# Patient Record
Sex: FEMALE | Race: WHITE | NOT HISPANIC OR LATINO | Employment: FULL TIME | URBAN - NONMETROPOLITAN AREA
[De-identification: names, ages, dates, MRNs, and addresses within clinical notes are randomized per-mention and may not be internally consistent; named-entity substitution may affect disease eponyms.]

---

## 2023-11-17 NOTE — H&P
History and physical    Subjective   Janis Zamora is a 40year old female. Chief Complaint   Patient presents with   Acute     HPI:    This is a 81-year-old G3 80 presents the office today for a consultation visit. Patient has a history of Essure procedure. For the past year or so, patient has been having irregular bleeding. Cycles are irregular, sometimes gets prolonged bleeding, bleeding twice per month. On exam, patient was found to have a cervical polyp. PMH:    Past Medical History:   Diagnosis Date   Acquired hypothyroidism 9/16/2019   Thyroid disease   Hypothyroidism     Past Surgical History:   Procedure Laterality Date   DENTAL SURGERY PROCEDURE NEC   LIGATE/CUT OVIDUCT(S)   Essure     Family History   Problem Relation Age of Onset   Thyroid Disorder Mother   Thyroid Disorder Brother   Heart Disorder Grandfather (Maternal)   Diabetes Uncle (Maternal)     Current Outpatient Medications   Medication Sig Dispense Refill   Bacillus Coagulans-Inulin (PROBIOTIC-PREBIOTIC) 1-250 BILLION-MG CAPS Take by mouth . 35 billion CFU    Calcium Carbonate-Vitamin D 600-200 MG-UNIT Oral Tablet Take 1 Tablet by mouth in the morning. Fish Oil Ultra 1400 MG Oral Capsule Take by mouth. Vitamin D (Ergocalciferol) 1.25 MG (47178 UT) Oral Capsule Take 1 Capsule by mouth every 4 weeks. Black Elderberry(Berry-Flower) 575 MG Oral Capsule Take by mouth. Levothyroxine Sodium 75 MCG Oral Tablet (Levoxyl) Take 1 Tablet by mouth in the morning. 90 Tablet 3   Hydrocortisone 2.5 % External Ointment Apply to red rough patches on face and body folds twice daily M-F. Stop adolfo clear. 28.35 g 5     No current facility-administered medications for this visit. Review of patient's allergies indicates: Allergen Reactions   Ragweed   ENVIRONMENTAL     ROS:  Review of Systems   Constitutional: Negative for activity change, appetite change, fatigue and fever.    HENT: Negative for congestion, facial swelling, hearing loss, sinus pressure and sinus pain. Eyes: Negative for discharge and itching. Respiratory: Negative for apnea and chest tightness. Cardiovascular: Negative for chest pain and leg swelling. Gastrointestinal: Negative for abdominal distention, abdominal pain and nausea. Endocrine: Negative for cold intolerance and heat intolerance. Genitourinary: Negative for difficulty urinating, dyspareunia, vaginal bleeding, vaginal discharge and vaginal pain. Musculoskeletal: Negative for arthralgias, back pain and gait problem. Skin: Negative for color change and pallor. Allergic/Immunologic: Negative for environmental allergies. Neurological: Negative for dizziness, facial asymmetry and numbness. Psychiatric/Behavioral: Negative for agitation, behavioral problems and suicidal ideas. Objective   /80  Pulse 60  Temp 36.3 °C (97.4 °F) (Infrared )  Resp 16  Ht 1.6 m (5' 3")  Wt 74.8 kg (165 lb)  SpO2 100%  BMI 29.23 kg/m²  BSA 1.82 m²     Physical Exam:  Physical Exam  Constitutional:   General: She is not in acute distress. Appearance: She is not ill-appearing or toxic-appearing. HENT:   Head: Normocephalic and atraumatic. Nose: Nose normal.   Mouth/Throat:   Mouth: Mucous membranes are moist.     Eyes:   Conjunctiva/sclera: Conjunctivae normal.   Pupils: Pupils are equal, round, and reactive to light. Cardiovascular:   Rate and Rhythm: Normal rate and regular rhythm. Pulmonary:   Effort: No respiratory distress. Breath sounds: Normal breath sounds. Abdominal:   Palpations: Abdomen is soft. Tenderness: There is no abdominal tenderness. There is no guarding.  exam endocervical polyp noted on speculum    Musculoskeletal:   General: No swelling or tenderness. Neurological:   Mental Status: She is alert and oriented to person, place, and time.      Psychiatric:   Mood and Affect: Mood normal.   Behavior: Behavior normal.     Extremities: no swelling or pain ASSESSMENT/PLAN:  Andry Jacob was seen today for acute. Diagnoses and all orders for this visit:    Abnormal uterine bleeding (AUB)    Cervical polyp      1. Abnormal uterine bleeding  Discussed 2 options. Birth control versus therapeutic hysteroscopy, D&C polypectomy. Patient opts for surgery. Risks, benefits alternatives were discussed with the patient. All questions were answered. Consents were signed. Patient will be for hysteroscopy, D&C, polypectomy on November.  At [address] [place] [address]

## 2023-11-21 RX ORDER — LEVOTHYROXINE SODIUM 0.07 MG/1
75 TABLET ORAL EVERY MORNING
COMMUNITY
Start: 2023-10-21

## 2023-11-21 RX ORDER — OMEGA-3S/DHA/EPA/FISH OIL/D3 300MG-1000
400 CAPSULE ORAL DAILY
COMMUNITY

## 2023-11-21 RX ORDER — ECHINACEA 400 MG
2 CAPSULE ORAL
COMMUNITY

## 2023-11-21 RX ORDER — PHENOL 1.4 %
600 AEROSOL, SPRAY (ML) MUCOUS MEMBRANE 2 TIMES DAILY WITH MEALS
COMMUNITY

## 2023-11-21 RX ORDER — BACILLUS COAGULANS/INULIN 1B-250 MG
1 CAPSULE ORAL
COMMUNITY

## 2023-11-21 RX ORDER — OMEGA-3S/DHA/EPA/FISH OIL 1000-1400
900 CAPSULE,DELAYED RELEASE (ENTERIC COATED) ORAL
COMMUNITY

## 2023-11-21 RX ORDER — VITAMIN B COMPLEX
1 CAPSULE ORAL DAILY
COMMUNITY

## 2023-11-21 NOTE — PRE-PROCEDURE INSTRUCTIONS
Pre-Surgery Instructions:   Medication Instructions    b complex vitamins capsule Stop taking 7 days prior to surgery. Bacillus Coagulans-Inulin (Probiotic) 1-250 BILLION-MG CAPS Stop taking 7 days prior to surgery. Black Elderberry,Berry-Flower, 18 MG CAPS Stop taking 7 days prior to surgery. calcium carbonate (OS-GAIL) 600 MG tablet Stop taking 7 days prior to surgery. cholecalciferol (VITAMIN D3) 400 units tablet Stop taking 7 days prior to surgery. levothyroxine 75 mcg tablet Take day of surgery. Pt verbalizes understanding of the following:    Please reference your [facility] Surgical Experience Booklet” for additional information to prepare for your upcoming surgery. - DO NOT EAT OR DRINK ANYTHING after midnight on the evening before your procedure including coffee, tea, gum or hard candy.    - ONLY SIPS OF WATER with your medications are allowed on the morning of your procedure. - Avoid OTC non-directed Vit/ Suppl/ Herbals 7 days prior to surgery to ensure no drug interactions with perioperative surgical/ anesthetic meds  - Avoid NSAIDs 3 days prior. Tylenol is ok to take as needed. - Avoid ASA containing products 5 days prior, unless otherwise instructed by your provider   - Bring a list of meds you take at home with your last dose noted    - Follow the pre surgery showering instructions as listed in the [facility] Surgical Experience Booklet” or otherwise provided by your surgeon's office.  - Bathing instructions, will use dial  - No lotions, powders, sprays, deodorant, perfume, jewelry, body piercings, false lashes or make-up  - Do not use a blade to shave the surgical area 1 week before surgery. It is ok to use clean electric clippers up to 24 hours before surgery. Do not shave any body parts with a razor within 24hrs. - Do not use dry shampoo, hair spray, hair gel, or any type of hair products.    - Remove nail polish, including gel polish, and any artificial, gel, or acrylic nails if possible. - For outpatient surgery, arrange for someone to drive you home after the procedure & stay with you until the next morning. Visitor guidelines discussed. - Bring insurance cards & photo id    - Please remove your contact lens. Bring a case for your glasses (or contacts). - Leave all valuables such as credit cards, money & jewelry at home  - Wear causal clothing that is easy to take on and off. Consider your type of surgery.    - Notify surgeon if you develop any new illnesses, exposure, develop a rash/ open wounds or have additional questions prior to your surgery. - Did the surgeon's office give you any other special instructions? no  - Did surgeon require any clearances? no    You will receive a call one business day prior to surgery with an arrival time and hospital directions. If your surgery is scheduled on a Monday, the hospital will be calling you on the Friday prior to your surgery.

## 2023-11-24 ENCOUNTER — HOSPITAL ENCOUNTER (OUTPATIENT)
Facility: HOSPITAL | Age: 38
Setting detail: OUTPATIENT SURGERY
Discharge: HOME | End: 2023-11-24
Attending: OBSTETRICS & GYNECOLOGY | Admitting: OBSTETRICS & GYNECOLOGY
Payer: COMMERCIAL

## 2023-11-24 ENCOUNTER — ANESTHESIA EVENT (OUTPATIENT)
Dept: PERIOP | Facility: HOSPITAL | Age: 38
End: 2023-11-24
Payer: COMMERCIAL

## 2023-11-24 ENCOUNTER — ANESTHESIA (OUTPATIENT)
Dept: PERIOP | Facility: HOSPITAL | Age: 38
End: 2023-11-24
Payer: COMMERCIAL

## 2023-11-24 VITALS
RESPIRATION RATE: 20 BRPM | SYSTOLIC BLOOD PRESSURE: 148 MMHG | WEIGHT: 160 LBS | TEMPERATURE: 97.6 F | HEIGHT: 63 IN | DIASTOLIC BLOOD PRESSURE: 74 MMHG | BODY MASS INDEX: 28.35 KG/M2 | OXYGEN SATURATION: 96 % | HEART RATE: 54 BPM

## 2023-11-24 DIAGNOSIS — N93.9 ABNORMAL UTERINE AND VAGINAL BLEEDING, UNSPECIFIED: ICD-10-CM

## 2023-11-24 PROBLEM — N84.1 CERVICAL POLYP: Status: ACTIVE | Noted: 2023-11-24

## 2023-11-24 LAB
EXT PREGNANCY TEST URINE: NEGATIVE
EXT. CONTROL: NORMAL

## 2023-11-24 PROCEDURE — 81025 URINE PREGNANCY TEST: CPT | Performed by: OBSTETRICS & GYNECOLOGY

## 2023-11-24 PROCEDURE — 88305 TISSUE EXAM BY PATHOLOGIST: CPT | Performed by: PATHOLOGY

## 2023-11-24 RX ORDER — CEFAZOLIN SODIUM 2 G/50ML
2000 SOLUTION INTRAVENOUS ONCE
Status: COMPLETED | OUTPATIENT
Start: 2023-11-24 | End: 2023-11-24

## 2023-11-24 RX ORDER — SODIUM CHLORIDE, SODIUM LACTATE, POTASSIUM CHLORIDE, CALCIUM CHLORIDE 600; 310; 30; 20 MG/100ML; MG/100ML; MG/100ML; MG/100ML
INJECTION, SOLUTION INTRAVENOUS CONTINUOUS PRN
Status: DISCONTINUED | OUTPATIENT
Start: 2023-11-24 | End: 2023-11-24

## 2023-11-24 RX ORDER — MIDAZOLAM HYDROCHLORIDE 2 MG/2ML
INJECTION, SOLUTION INTRAMUSCULAR; INTRAVENOUS AS NEEDED
Status: DISCONTINUED | OUTPATIENT
Start: 2023-11-24 | End: 2023-11-24

## 2023-11-24 RX ORDER — FENTANYL CITRATE/PF 50 MCG/ML
25 SYRINGE (ML) INJECTION
Status: DISCONTINUED | OUTPATIENT
Start: 2023-11-24 | End: 2023-11-24 | Stop reason: HOSPADM

## 2023-11-24 RX ORDER — DEXAMETHASONE SODIUM PHOSPHATE 10 MG/ML
INJECTION, SOLUTION INTRAMUSCULAR; INTRAVENOUS AS NEEDED
Status: DISCONTINUED | OUTPATIENT
Start: 2023-11-24 | End: 2023-11-24

## 2023-11-24 RX ORDER — METOCLOPRAMIDE HYDROCHLORIDE 5 MG/ML
INJECTION INTRAMUSCULAR; INTRAVENOUS AS NEEDED
Status: DISCONTINUED | OUTPATIENT
Start: 2023-11-24 | End: 2023-11-24

## 2023-11-24 RX ORDER — SCOLOPAMINE TRANSDERMAL SYSTEM 1 MG/1
1 PATCH, EXTENDED RELEASE TRANSDERMAL
Status: DISCONTINUED | OUTPATIENT
Start: 2023-11-24 | End: 2023-11-24 | Stop reason: HOSPADM

## 2023-11-24 RX ORDER — ALBUTEROL SULFATE 2.5 MG/3ML
2.5 SOLUTION RESPIRATORY (INHALATION) ONCE AS NEEDED
Status: DISCONTINUED | OUTPATIENT
Start: 2023-11-24 | End: 2023-11-24 | Stop reason: HOSPADM

## 2023-11-24 RX ORDER — ONDANSETRON 2 MG/ML
4 INJECTION INTRAMUSCULAR; INTRAVENOUS EVERY 6 HOURS PRN
Status: DISCONTINUED | OUTPATIENT
Start: 2023-11-24 | End: 2023-11-24 | Stop reason: HOSPADM

## 2023-11-24 RX ORDER — ONDANSETRON 2 MG/ML
INJECTION INTRAMUSCULAR; INTRAVENOUS AS NEEDED
Status: DISCONTINUED | OUTPATIENT
Start: 2023-11-24 | End: 2023-11-24

## 2023-11-24 RX ORDER — SODIUM CHLORIDE, SODIUM LACTATE, POTASSIUM CHLORIDE, CALCIUM CHLORIDE 600; 310; 30; 20 MG/100ML; MG/100ML; MG/100ML; MG/100ML
125 INJECTION, SOLUTION INTRAVENOUS CONTINUOUS
Status: DISCONTINUED | OUTPATIENT
Start: 2023-11-24 | End: 2023-11-24 | Stop reason: HOSPADM

## 2023-11-24 RX ORDER — SODIUM CHLORIDE 9 MG/ML
125 INJECTION, SOLUTION INTRAVENOUS CONTINUOUS
Status: DISCONTINUED | OUTPATIENT
Start: 2023-11-24 | End: 2023-11-24 | Stop reason: HOSPADM

## 2023-11-24 RX ORDER — ONDANSETRON 2 MG/ML
4 INJECTION INTRAMUSCULAR; INTRAVENOUS ONCE AS NEEDED
Status: DISCONTINUED | OUTPATIENT
Start: 2023-11-24 | End: 2023-11-24 | Stop reason: HOSPADM

## 2023-11-24 RX ORDER — PROPOFOL 10 MG/ML
INJECTION, EMULSION INTRAVENOUS AS NEEDED
Status: DISCONTINUED | OUTPATIENT
Start: 2023-11-24 | End: 2023-11-24

## 2023-11-24 RX ORDER — LIDOCAINE HYDROCHLORIDE 10 MG/ML
INJECTION, SOLUTION EPIDURAL; INFILTRATION; INTRACAUDAL; PERINEURAL AS NEEDED
Status: DISCONTINUED | OUTPATIENT
Start: 2023-11-24 | End: 2023-11-24

## 2023-11-24 RX ORDER — FENTANYL CITRATE 50 UG/ML
INJECTION, SOLUTION INTRAMUSCULAR; INTRAVENOUS AS NEEDED
Status: DISCONTINUED | OUTPATIENT
Start: 2023-11-24 | End: 2023-11-24

## 2023-11-24 RX ADMIN — FENTANYL CITRATE 25 MCG: 50 INJECTION INTRAMUSCULAR; INTRAVENOUS at 07:46

## 2023-11-24 RX ADMIN — FENTANYL CITRATE 25 MCG: 50 INJECTION INTRAMUSCULAR; INTRAVENOUS at 07:33

## 2023-11-24 RX ADMIN — PROPOFOL 150 MG: 10 INJECTION, EMULSION INTRAVENOUS at 07:33

## 2023-11-24 RX ADMIN — MIDAZOLAM 2 MG: 1 INJECTION INTRAMUSCULAR; INTRAVENOUS at 07:28

## 2023-11-24 RX ADMIN — CEFAZOLIN SODIUM 2000 MG: 2 SOLUTION INTRAVENOUS at 07:28

## 2023-11-24 RX ADMIN — DEXAMETHASONE SODIUM PHOSPHATE 8 MG: 10 INJECTION, SOLUTION INTRAMUSCULAR; INTRAVENOUS at 07:39

## 2023-11-24 RX ADMIN — SODIUM CHLORIDE, SODIUM LACTATE, POTASSIUM CHLORIDE, AND CALCIUM CHLORIDE: .6; .31; .03; .02 INJECTION, SOLUTION INTRAVENOUS at 07:15

## 2023-11-24 RX ADMIN — FENTANYL CITRATE 25 MCG: 50 INJECTION INTRAMUSCULAR; INTRAVENOUS at 07:36

## 2023-11-24 RX ADMIN — LIDOCAINE HYDROCHLORIDE 50 MG: 10 INJECTION, SOLUTION EPIDURAL; INFILTRATION; INTRACAUDAL; PERINEURAL at 07:33

## 2023-11-24 RX ADMIN — FENTANYL CITRATE 25 MCG: 50 INJECTION INTRAMUSCULAR; INTRAVENOUS at 08:00

## 2023-11-24 RX ADMIN — METOCLOPRAMIDE HYDROCHLORIDE 10 MG: 5 INJECTION INTRAMUSCULAR; INTRAVENOUS at 07:39

## 2023-11-24 RX ADMIN — ONDANSETRON 4 MG: 2 INJECTION INTRAMUSCULAR; INTRAVENOUS at 07:41

## 2023-11-24 RX ADMIN — SCOPALAMINE 1 PATCH: 1 PATCH, EXTENDED RELEASE TRANSDERMAL at 07:24

## 2023-11-24 NOTE — INTERVAL H&P NOTE
H&P reviewed. After examining the patient I find no changes in the patients condition since the H&P had been written.     Vitals:    11/24/23 0625   BP: 134/95   Pulse: (!) 52   Resp: 18   Temp: (!) 97.4 °F (36.3 °C)   SpO2: 100%

## 2023-11-24 NOTE — ANESTHESIA PREPROCEDURE EVALUATION
Procedure:  HYSTEROSCOPY, DILATION AND CURETTAGE, POLYPECTOMY (Uterus)    Relevant Problems   ENDO   (+) Acquired hypothyroidism      Genitourinary   (+) Abnormal uterine bleeding (AUB)   (+) Cervical polyp        Physical Exam    Airway    Mallampati score: I  TM Distance: >3 FB  Neck ROM: full     Dental   No notable dental hx     Cardiovascular      Pulmonary      Other Findings  post-pubertal.      Anesthesia Plan  ASA Score- 2     Anesthesia Type- general with ASA Monitors. Additional Monitors:     Airway Plan: LMA. Plan Factors-Exercise tolerance (METS): >4 METS. Chart reviewed. Patient summary reviewed. Patient is not a current smoker. Induction- intravenous. Postoperative Plan- . Planned trial extubation    Informed Consent- Anesthetic plan and risks discussed with patient. I personally reviewed this patient with the CRNA. Discussed and agreed on the Anesthesia Plan with the CRNA. Darylene Pipe VITALS  /95   Pulse (!) 52   Temp (!) 97.4 °F (36.3 °C)   Resp 18   Ht 5' 3" (1.6 m)   Wt 72.6 kg (160 lb)   LMP 11/03/2023   SpO2 100%   BMI 28.34 kg/m²   BP Readings from Last 3 Encounters:   11/24/23 134/95     LABS  No results for input(s): "SODIUM", "K", "CL", "CO2", "AGAP", "BUN", "CREATININE", "CALCIUM", "GLUC", "CAION", "PHOS", "MG", "AST", "ALT", "ALKPHOS", "TBILI", "ALB" in the last 8784 hours. No results for input(s): "WBC", "HGB", "HCT", "PLT" in the last 8784 hours. No results for input(s): "APTT", "INR", "PTT" in the last 8784 hours. ECG  n/a  No results found for this or any previous visit (from the past 4464 hour(s)). No results found for this or any previous visit.     ECHOCARDIOGRAPHY, OTHER CARDIAC TESTING, AND IMAGING  n/a    ANESTHESIA RISK-BENEFIT DISCUSSION  BENEFITS INCLUDE THE FOLLOWING ([address] [id number], PMID [id number]): (1) Involvement of a dedicated anesthesia team reduces mortality and morbidity for major surgeries, (2) The team provides as much analgesia/sedation/amnesia/akinesia as is reasonably possible, and (3) The team strives to reduce discomfort and distress as much as reasonably achievable. RISKS (AND PLANS TO MITIGATE RISKS) INCLUDES THE FOLLOWING:    Neurologic Assessment: IntraOp awareness (Risk is ~1:1,000 - 1:14,000; PMID [id number]), Stroke (Risk ~<0.1-2% for most cases; PMID 57843667), nerve injury, vision loss, and POCD. Airway and Pulmonary Assessment: Dental or mouth injury, throat pain, critical hypoxia, pneumothorax, prolonged intubation, post-op respiratory compromise. Airway/Intubation risks and information: No prior advanced airway notes in [facility] EMR  Major ARISCAT risk factors include: none, yielding a score 0-1= Low risk, 1.6%. Cardiovascular Assessment: Hypotension, arrhythmias, and erica-op cardiac injury (MACE). In cases where specialized vascular access is needed, then additional risks including bleeding, infection, or injury to adjacent structures. If a bypass circuit is needed then risk of stroke, blood clot, and vasoplegia. Signs of active, severe cardiac instability: none  Rakesh's RCRI score items: none, yielding an RCRI Score of 0= 0.4% risk of MACE  Are erica-op or intra-op beta blockers indicated? (PMID [id number]): no  FEN/GI Assessment: Aspiration (Approximately 0.5% risk per the IRIS trial) and PONV (10-80% depending on Apfel criteria). Does the patient meet ASA NPO guidelines?: Yes  Adverse Drug Risk Assessment: Allergic reactions, overdoses, drug-drug interactions, injury to a fetus or  in pregnant or breastfeeding patients, increased risk of injury or accident if performing potentially hazardous tasks before anesthetic medications have been fully excreted/metabolized.   Recent medications relevant to anesthetic plan: See MAR or Med Review  Personal or family history of anesthesia complications: no  Pregnancy Status: Negative  Mortality risks associated with anesthesia based on ASA-PS (PMID 75485103): ASA-PS II: Estimated risk  1:20,000

## 2023-11-24 NOTE — ANESTHESIA POSTPROCEDURE EVALUATION
Post-Op Assessment Note    CV Status:  Stable    Pain management: adequate       Mental Status:  Alert and awake   Hydration Status:  Euvolemic   PONV Controlled:  Controlled   Airway Patency:  Patent     Post Op Vitals Reviewed: Yes    No anethesia notable event occurred.     Staff: CRNA               BP      Temp (!) 97.3 °F (36.3 °C) (11/24/23 0810)    Pulse 56 (11/24/23 0810)   Resp 15 (11/24/23 0810)    SpO2 100 % (11/24/23 0810)

## 2023-11-24 NOTE — OP NOTE
OPERATIVE REPORT  PATIENT NAME: Farhana Barbosa    :  1985  MRN: [id number]  Pt Location: OW OR ROOM 02    SURGERY DATE: 2023    Surgeon(s) and Role:     * Fran Slaughter DO - Primary    Preop Diagnosis:  Abnormal uterine and vaginal bleeding, unspecified [N93.9]    Post-Op Diagnosis Codes:     * Abnormal uterine and vaginal bleeding, unspecified [N93.9]    Procedure(s): HYSTEROSCOPY. DILATION AND CURETTAGE. POLYPECTOMY    Specimen(s):  ID Type Source Tests Collected by Time Destination   1 : POLYP Tissue Polyp, Cervical/Endometrial TISSUE EXAM Fran Slaughter, DO 2023  7:47 AM    2 : CURETTINGS Tissue Endometrium TISSUE EXAM Fran Slaughter, DO 2023  7:48 AM        Estimated Blood Loss:   Minimal    Drains:  * No LDAs found *    Anesthesia Type:   General    Operative Indications:  Abnormal uterine and vaginal bleeding, unspecified [N93.9]      Operative Findings:  Endocervical, and endometrial polyp    Complications:   None    Procedure and Technique:  The patient was taken to the operating room where general anesthesia was administered without difficulty. The patient was placed in the dorsolithotomy position with stirrups. An exam under anesthesia revealed a normal anteverted uterus. The patient was then prepared and draped in the normal sterile fashion. A weighted speculum was inserted into the posterior aspect of the vagina. A single-tooth tenaculum was used to grasp the entry of the cervix. Patient was found to have a polyp at the cervical os. The polyp was grasped with a Mervat forceps. It was twisted and removed without difficulty. The uterus was carefully sounded to accommodate the hysteroscope using dilators. The hysteroscope was then introduced under direct visualization, the uterus was distended with normal saline. Upon visualization she was also found to have an endometrial polyp in the cavity. Resectoscope was inserted.   Polyp removed without issue. The hysteroscope was then withdrawn and the cervix was again dilated to accommodate the curette. The uterus was curetted in a clockwise fashion. The scrapings were then sent to pathology. The tenaculum was removed from the cervix and good hemostasis was noted. The patient tolerated procedure well. The instrument and sponge counts were correct x2. The patient was awakened from general anesthesia and taken to the recovery room in a stable condition. The patient will go home after recovery from anesthesia and meeting all the criteria for discharge. She is given instruction regarding a follow visit in two weeks in the office.      Patient Disposition:  PACU         SIGNATURE: Kathryn Jasso., DO  DATE: November 24, 2023  TIME: 8:01 AM

## 2023-11-24 NOTE — DISCHARGE SUMMARY
Discharge Summary - Janis Zamora 40 y.o. female MRN: [id number]    Unit/Bed#: [facility] Encounter: [id number]    Admission Date:     Admitting Diagnosis: Abnormal uterine and vaginal bleeding, unspecified [N93.9]      Procedures Performed: Hysteroscopy, D&C, polypectomy      Summary of Hospital Course: Patient was here for scheduled surgery. No issues and discharged home the same day     Significant Findings, Care, Treatment and Services Provided: Endocervical and endometrial polyps    Complications: None    Discharge Diagnosis: Normal uterine bleeding    Medical Problems       Resolved Problems  Date Reviewed: 11/24/2023   None         Condition at Discharge: good         Discharge instructions/Information to patient and family:   See after visit summary for information provided to patient and family. Provisions for Follow-Up Care:  See after visit summary for information related to follow-up care and any pertinent home health orders. PCP: No primary care provider on file. Disposition: Home    Planned Readmission: No      Discharge Statement   I spent 15 minutes discharging the patient. This time was spent on the day of discharge. I had direct contact with the patient on the day of discharge. Additional documentation is required if more than 30 minutes were spent on discharge. Discharge Medications:  See after visit summary for reconciled discharge medications provided to patient and family.

## 2023-11-24 NOTE — DISCHARGE INSTR - AVS FIRST PAGE
Please take Tylenol and Advil as needed  No lifting restrictions  No sex, tampons or douching until you see me in the office  Please make an appointment to see me in the office in 3 weeks  Please call with any abnormal discharge like pus. Fevers, etc.  Regular diet.   May shower tonight or tomorrow

## 2023-11-29 PROCEDURE — 88305 TISSUE EXAM BY PATHOLOGIST: CPT | Performed by: PATHOLOGY

## (undated) DEVICE — MAYO STAND COVER: Brand: CONVERTORS

## (undated) DEVICE — STERILE SURGICAL LUBRICANT,  TUBE: Brand: SURGILUBE

## (undated) DEVICE — THREE-QUARTER SHEET: Brand: CONVERTORS

## (undated) DEVICE — TISSUE REMOVAL SYSTEM FLUID MANAGEMENT ACCESSORIES: Brand: SYMPHION

## (undated) DEVICE — DRAPE EQUIPMENT RF WAND

## (undated) DEVICE — ARTHROSCOPY FLOOR MAT

## (undated) DEVICE — STRL ALLENTOWN HYSTEROSCOPY PK: Brand: CARDINAL HEALTH

## (undated) DEVICE — DRAPE,UNDERBUTTOCKS,PCH,STERILE: Brand: MEDLINE

## (undated) DEVICE — DISPOSABLE OR TOWEL: Brand: CARDINAL HEALTH

## (undated) DEVICE — MAXI PAD5.51 X 13.78 IN. (14.0 X 35.0 CM)HEAVYCONTOUREDUNSCENTED: Brand: CURITY

## (undated) DEVICE — GLOVE INDICATOR PI UNDERGLOVE SZ 7.5 BLUE

## (undated) DEVICE — CHLORHEXIDINE 4PCT 4 OZ

## (undated) DEVICE — 4-PORT MANIFOLD: Brand: NEPTUNE 2

## (undated) DEVICE — PREMIUM DRY TRAY LF: Brand: MEDLINE INDUSTRIES, INC.

## (undated) DEVICE — TISSUE REMOVAL SYSTEM RESECTING DEVICE: Brand: SYMPHION

## (undated) DEVICE — PVC URETHRAL CATHETER: Brand: DOVER